# Patient Record
Sex: FEMALE | Race: OTHER | Employment: UNEMPLOYED | URBAN - METROPOLITAN AREA
[De-identification: names, ages, dates, MRNs, and addresses within clinical notes are randomized per-mention and may not be internally consistent; named-entity substitution may affect disease eponyms.]

---
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
## 2023-05-02 NOTE — TELEPHONE ENCOUNTER
----- Message from Yolanda Lau PA-C sent at 5/2/2023 11:06 AM CDT -----  Labs returned and COVID/flu were negative but strep is positive. Will treat with abx. Please confirm pharmacy.    Spoke with Dr. Donnell Quintana. Okay to decrease Zanaflex 1 mg (1/2 tab) to twice daily, discontinuing the morning dose. If child is still experiencing daytime drowsiness, advise patient to call the office and get an appointment with Man Brady CNP to be seen sooner. May consider decreasing back to 1 mg nightly.

## 2023-05-25 LAB
ACTIVATED PARTIAL THROMBOPLASTIN TIME IN PPP BY COAGULATION ASSAY: 30 SEC (ref 26–39)
ALBUMIN (G/DL) IN SER/PLAS: 4 G/DL (ref 3.4–5)
ANION GAP IN SER/PLAS: 15 MMOL/L (ref 10–20)
CALCIUM (MG/DL) IN SER/PLAS: 9.4 MG/DL (ref 8.6–10.6)
CARBON DIOXIDE, TOTAL (MMOL/L) IN SER/PLAS: 22 MMOL/L (ref 21–32)
CHLORIDE (MMOL/L) IN SER/PLAS: 105 MMOL/L (ref 98–107)
CREATININE (MG/DL) IN SER/PLAS: 0.25 MG/DL (ref 0.5–1.05)
ERYTHROCYTE DISTRIBUTION WIDTH (RATIO) BY AUTOMATED COUNT: 13.5 % (ref 11.5–14.5)
ERYTHROCYTE MEAN CORPUSCULAR HEMOGLOBIN CONCENTRATION (G/DL) BY AUTOMATED: 32.8 G/DL (ref 32–36)
ERYTHROCYTE MEAN CORPUSCULAR VOLUME (FL) BY AUTOMATED COUNT: 91 FL (ref 80–100)
ERYTHROCYTES (10*6/UL) IN BLOOD BY AUTOMATED COUNT: 4.99 X10E12/L (ref 4–5.2)
GFR FEMALE: >90 ML/MIN/1.73M2
GLUCOSE (MG/DL) IN SER/PLAS: 86 MG/DL (ref 74–99)
HEMATOCRIT (%) IN BLOOD BY AUTOMATED COUNT: 45.4 % (ref 36–46)
HEMOGLOBIN (G/DL) IN BLOOD: 14.9 G/DL (ref 12–16)
INR IN PPP BY COAGULATION ASSAY: 1 (ref 0.9–1.1)
LEUKOCYTES (10*3/UL) IN BLOOD BY AUTOMATED COUNT: 7.9 X10E9/L (ref 4.4–11.3)
NRBC (PER 100 WBCS) BY AUTOMATED COUNT: 0 /100 WBC (ref 0–0)
PHOSPHATE (MG/DL) IN SER/PLAS: 3.2 MG/DL (ref 2.5–4.9)
PLATELETS (10*3/UL) IN BLOOD AUTOMATED COUNT: 300 X10E9/L (ref 150–450)
POTASSIUM (MMOL/L) IN SER/PLAS: 4.4 MMOL/L (ref 3.5–5.3)
PREALBUMIN (MG/DL) IN SERUM OR PLASMA: 25.8 MG/DL (ref 18–40)
PROTHROMBIN TIME (PT) IN PPP BY COAGULATION ASSAY: 11.9 SEC (ref 9.8–13.4)
SODIUM (MMOL/L) IN SER/PLAS: 138 MMOL/L (ref 136–145)
UREA NITROGEN (MG/DL) IN SER/PLAS: 12 MG/DL (ref 6–23)

## 2023-05-27 LAB — STAPH/MRSA SCREEN, CULTURE: NORMAL

## 2023-07-13 LAB — C REACTIVE PROTEIN (MG/L) IN SER/PLAS: 4.1 MG/DL

## 2023-08-10 LAB
ALANINE AMINOTRANSFERASE (SGPT) (U/L) IN SER/PLAS: 903 U/L (ref 7–45)
ALBUMIN (G/DL) IN SER/PLAS: 3.7 G/DL (ref 3.4–5)
ALKALINE PHOSPHATASE (U/L) IN SER/PLAS: 126 U/L (ref 33–110)
ANION GAP IN SER/PLAS: 15 MMOL/L (ref 10–20)
ASPARTATE AMINOTRANSFERASE (SGOT) (U/L) IN SER/PLAS: 572 U/L (ref 9–39)
BASOPHILS (10*3/UL) IN BLOOD BY AUTOMATED COUNT: 0.06 X10E9/L (ref 0–0.1)
BASOPHILS/100 LEUKOCYTES IN BLOOD BY AUTOMATED COUNT: 0.8 % (ref 0–2)
BILIRUBIN TOTAL (MG/DL) IN SER/PLAS: 0.5 MG/DL (ref 0–1.2)
C REACTIVE PROTEIN (MG/L) IN SER/PLAS: 3.25 MG/DL
CALCIUM (MG/DL) IN SER/PLAS: 9.7 MG/DL (ref 8.6–10.3)
CARBON DIOXIDE, TOTAL (MMOL/L) IN SER/PLAS: 24 MMOL/L (ref 21–32)
CHLORIDE (MMOL/L) IN SER/PLAS: 102 MMOL/L (ref 98–107)
CREATININE (MG/DL) IN SER/PLAS: 0.27 MG/DL (ref 0.5–1.05)
EOSINOPHILS (10*3/UL) IN BLOOD BY AUTOMATED COUNT: 0.13 X10E9/L (ref 0–0.7)
EOSINOPHILS/100 LEUKOCYTES IN BLOOD BY AUTOMATED COUNT: 1.7 % (ref 0–6)
ERYTHROCYTE DISTRIBUTION WIDTH (RATIO) BY AUTOMATED COUNT: 15 % (ref 11.5–14.5)
ERYTHROCYTE MEAN CORPUSCULAR HEMOGLOBIN CONCENTRATION (G/DL) BY AUTOMATED: 31.7 G/DL (ref 32–36)
ERYTHROCYTE MEAN CORPUSCULAR VOLUME (FL) BY AUTOMATED COUNT: 92 FL (ref 80–100)
ERYTHROCYTES (10*6/UL) IN BLOOD BY AUTOMATED COUNT: 3.86 X10E12/L (ref 4–5.2)
GFR FEMALE: >90 ML/MIN/1.73M2
GLUCOSE (MG/DL) IN SER/PLAS: 80 MG/DL (ref 74–99)
HEMATOCRIT (%) IN BLOOD BY AUTOMATED COUNT: 35.6 % (ref 36–46)
HEMOGLOBIN (G/DL) IN BLOOD: 11.3 G/DL (ref 12–16)
IMMATURE GRANULOCYTES/100 LEUKOCYTES IN BLOOD BY AUTOMATED COUNT: 0.3 % (ref 0–0.9)
LEUKOCYTES (10*3/UL) IN BLOOD BY AUTOMATED COUNT: 7.5 X10E9/L (ref 4.4–11.3)
LYMPHOCYTES (10*3/UL) IN BLOOD BY AUTOMATED COUNT: 1.73 X10E9/L (ref 1.2–4.8)
LYMPHOCYTES/100 LEUKOCYTES IN BLOOD BY AUTOMATED COUNT: 23.1 % (ref 13–44)
MONOCYTES (10*3/UL) IN BLOOD BY AUTOMATED COUNT: 0.5 X10E9/L (ref 0.1–1)
MONOCYTES/100 LEUKOCYTES IN BLOOD BY AUTOMATED COUNT: 6.7 % (ref 2–10)
NEUTROPHILS (10*3/UL) IN BLOOD BY AUTOMATED COUNT: 5.04 X10E9/L (ref 1.2–7.7)
NEUTROPHILS/100 LEUKOCYTES IN BLOOD BY AUTOMATED COUNT: 67.4 % (ref 40–80)
PLATELETS (10*3/UL) IN BLOOD AUTOMATED COUNT: 432 X10E9/L (ref 150–450)
POTASSIUM (MMOL/L) IN SER/PLAS: 4 MMOL/L (ref 3.5–5.3)
PROTEIN TOTAL: 7.9 G/DL (ref 6.4–8.2)
SEDIMENTATION RATE, ERYTHROCYTE: 54 MM/H (ref 0–20)
SODIUM (MMOL/L) IN SER/PLAS: 137 MMOL/L (ref 136–145)
UREA NITROGEN (MG/DL) IN SER/PLAS: 9 MG/DL (ref 6–23)

## 2023-10-18 ENCOUNTER — TELEPHONE (OUTPATIENT)
Dept: PEDIATRICS | Facility: HOSPITAL | Age: 18
End: 2023-10-18
Payer: MEDICAID

## 2023-10-18 DIAGNOSIS — R74.8 ELEVATED LIVER ENZYMES: Primary | ICD-10-CM

## 2023-10-18 NOTE — TELEPHONE ENCOUNTER
This RN received a phone call from Neville (Facility Caregiver) requesting lab requisitions be faxed to [facility]. Neville stated closest  facility is ~100 miles away.     This RN reviewed [facility] for lab orders placed after last hospital admission in August 2023. Labs to be drawn: crp, cmp, ggt, hfp, smooth muscle antibody. Labs ordered in [facility], requisition printed and faxed to .     This RN spoke with Neville at 1430, fax was received. No further questions/concerns at this time.

## 2023-11-06 ENCOUNTER — APPOINTMENT (OUTPATIENT)
Dept: PEDIATRIC GASTROENTEROLOGY | Facility: CLINIC | Age: 18
End: 2023-11-06
Payer: MEDICAID

## 2023-11-08 PROBLEM — M21.959 ACQUIRED DEFORMITY OF HIP: Status: ACTIVE | Noted: 2023-11-08

## 2023-11-08 PROBLEM — M24.359 SPASTIC HIP DISLOCATION: Status: ACTIVE | Noted: 2023-11-08

## 2023-11-08 PROBLEM — G80.9 CEREBRAL PALSY (MULTI): Status: ACTIVE | Noted: 2023-11-08

## 2023-11-08 PROBLEM — G80.0: Status: ACTIVE | Noted: 2023-11-08

## 2023-11-08 PROBLEM — M86.60 OSTEOMYELITIS, CHRONIC (MULTI): Status: ACTIVE | Noted: 2023-11-08

## 2023-11-08 PROBLEM — M41.45 NEUROMUSCULAR SCOLIOSIS OF THORACOLUMBAR REGION: Status: ACTIVE | Noted: 2023-11-08

## 2023-11-08 RX ORDER — DIAZEPAM ORAL 5 MG/5ML
SOLUTION ORAL
COMMUNITY
Start: 2015-05-26

## 2023-11-08 RX ORDER — METRONIDAZOLE 250 MG/1
TABLET ORAL
COMMUNITY
Start: 2023-10-17 | End: 2023-11-09 | Stop reason: SDUPTHER

## 2023-11-08 RX ORDER — BACLOFEN 10 MG/1
1 TABLET ORAL 3 TIMES DAILY
COMMUNITY
Start: 2013-04-12

## 2023-11-08 RX ORDER — LEVONORGESTREL AND ETHINYL ESTRADIOL 0.1-0.02MG
1 KIT ORAL EVERY MORNING
COMMUNITY
Start: 2023-10-26

## 2023-11-08 RX ORDER — DIAZEPAM 2 MG/1
TABLET ORAL
COMMUNITY
Start: 2023-06-08

## 2023-11-08 RX ORDER — LEVOFLOXACIN 750 MG/1
TABLET ORAL
COMMUNITY
Start: 2023-10-13 | End: 2023-11-09 | Stop reason: SDUPTHER

## 2023-11-08 RX ORDER — TIZANIDINE 4 MG/1
TABLET ORAL
COMMUNITY
Start: 2023-10-10

## 2023-11-08 RX ORDER — GUANFACINE 1 MG/1
0.5 TABLET ORAL 2 TIMES DAILY
COMMUNITY
Start: 2015-01-30

## 2023-11-08 RX ORDER — POLYETHYLENE GLYCOL 3350 17 G/17G
POWDER, FOR SOLUTION ORAL
COMMUNITY
Start: 2023-06-08

## 2023-11-08 RX ORDER — CHLORHEXIDINE GLUCONATE ORAL RINSE 1.2 MG/ML
SOLUTION DENTAL
COMMUNITY
Start: 2023-09-21

## 2023-11-08 RX ORDER — FLUOXETINE 10 MG/1
15 TABLET ORAL DAILY
COMMUNITY
Start: 2023-10-13

## 2023-11-08 RX ORDER — NAPROXEN 500 MG/1
TABLET ORAL
COMMUNITY
Start: 2023-06-08

## 2023-11-09 ENCOUNTER — OFFICE VISIT (OUTPATIENT)
Dept: INFECTIOUS DISEASES | Facility: HOSPITAL | Age: 18
End: 2023-11-09
Payer: MEDICAID

## 2023-11-09 VITALS
SYSTOLIC BLOOD PRESSURE: 111 MMHG | TEMPERATURE: 98 F | WEIGHT: 83.33 LBS | HEART RATE: 97 BPM | DIASTOLIC BLOOD PRESSURE: 78 MMHG

## 2023-11-09 DIAGNOSIS — M46.20: Primary | ICD-10-CM

## 2023-11-09 PROCEDURE — 99214 OFFICE O/P EST MOD 30 MIN: CPT | Mod: GC | Performed by: PEDIATRICS

## 2023-11-09 PROCEDURE — 99214 OFFICE O/P EST MOD 30 MIN: CPT | Performed by: PEDIATRICS

## 2023-11-09 RX ORDER — LEVOFLOXACIN 750 MG/1
TABLET ORAL
Qty: 30 TABLET | Refills: 1 | Status: SHIPPED | OUTPATIENT
Start: 2023-11-09 | End: 2023-12-31

## 2023-11-09 RX ORDER — METRONIDAZOLE 250 MG/1
375 TABLET ORAL 3 TIMES DAILY
Qty: 135 TABLET | Refills: 1 | Status: SHIPPED | OUTPATIENT
Start: 2023-11-09 | End: 2024-01-08

## 2023-11-09 ASSESSMENT — ENCOUNTER SYMPTOMS
FLANK PAIN: 0
ARTHRALGIAS: 0
UNEXPECTED WEIGHT CHANGE: 0
STRIDOR: 0
FATIGUE: 0
FACIAL SWELLING: 0
EYE DISCHARGE: 0
MYALGIAS: 0
APPETITE CHANGE: 0
HEADACHES: 0
ABDOMINAL PAIN: 0
DYSURIA: 0
SHORTNESS OF BREATH: 0
CONSTIPATION: 0
EYE REDNESS: 0
COUGH: 0
DIARRHEA: 0
ACTIVITY CHANGE: 0
FEVER: 0
NAUSEA: 0
WOUND: 0
ADENOPATHY: 0
JOINT SWELLING: 0
BRUISES/BLEEDS EASILY: 0
CHILLS: 0
WHEEZING: 0
SORE THROAT: 0
RHINORRHEA: 0
WEAKNESS: 0
VOMITING: 0

## 2023-11-09 NOTE — PATIENT INSTRUCTIONS
Will refill the PO Flagyl and Levofloxacin. Complete 6 more weeks of therapy  Call with questions or concerns.    The phone number to the Pediatric Infectious Disease office is: [phone] or you can email us at [email or web address]. Phone calls and emails are typically addressed within 48 hours.

## 2023-11-09 NOTE — LETTER
11/09/23    Karlos Rome MD  [address]  [address]  [facility] [address]      Dear Dr. Karlos Rome MD,    I am writing to confirm that your patient, Renetta Chen, received care in my office on 11/09/23. I have enclosed a summary of the care provided to Renetta for your reference.    Please contact me with any questions you may have regarding the visit.    Sincerely,         Ericka Posey MD  [address]  [address]  [place] [facility] [id number]    CC: No Recipients [No Adverse Anesthesia Reaction] : no adverse anesthesia reaction in self or family member [No Pertinent Pulmonary History] : no history of asthma, COPD, sleep apnea, or smoking [No Pertinent Cardiac History] : no history of aortic stenosis, atrial fibrillation, coronary artery disease, recent myocardial infarction, or implantable device/pacemaker [Excellent (>10 METs)] : Excellent (>10 METs) [Chronic Kidney Disease] : no chronic kidney disease [Chronic Anticoagulation] : no chronic anticoagulation [Diabetes] : no diabetes [FreeTextEntry1] : Fistulotomy [FreeTextEntry2] : 7/31/20 [FreeTextEntry3] : Dr. Barbour [FreeTextEntry4] : anal fistula w/ intermittent drainage. requires surgical management.

## 2023-11-09 NOTE — PROGRESS NOTES
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 ALT was 903, up from 12 and AST was 527 up from 20 compared to June. Alk Phos elevated to 126, although no hyperbilirubinemia.    She was discharged on PO Flagyl and Levofloxacin and had recent lab work on 10/18 with normalized LFTs.    Of note, prior to that she was admitted to  [facility] from 6/25-7/3 for management of a post-surgical spinal hardware infection/osteomyelitis after she had developed some drainage from her spinal surgical wound, and wound was noted to have dehiscence. Per family reports, there had been contamination of her spinal wound with stool. Initially she was started on broad spectrum antibiotics vanc/zosyn, later switched to vancomycin/cefepime/metronidazole given an RUSS. She underwent two I&D's with orthopedic surgery- once 6/26, and another 6/28 with wound closure at that time. Deep surgical cultures grew E. coli (pan-susceptible), P. bivia, M. micronuciformis, while biopsy from lumbar spine during 2nd I&D grew E. coli. While her previous bone graft was replaced, she still has some of her original hardware from her first surgery. Caregiver claims that she has seen  providers since discharge, no documentation found in our system.     PMH: nonverbal   PSH: hip surgery at 10, spinal fusion 6/7  Meds: Baclofen 10 mg TID, Guanfacine 1 mg in the AM 0.5 mg in PM, Lessina, Fluoxetine 15 mg, Clorhexadine swab BID, Multivitamin, Tizanidine 6 mg " nightly, PRN: Zinc oxide ointment, Tylenol, Miralax, Ibuprofen, Triamcinolone cream. Normally takes medications  with applesauce.   Allergy: NKDA  Diet: normal pediatric diet - in bite size pieces   Social: lives in home staffed 24/7. Currently head of home has co-guardianship   immunizations: up to date  FH: aunt previously taking tizanidine but taken off for liver issues    Immunization History  Immunization History   Administered Date(s) Administered    DTaP vaccine, pediatric  (INFANRIX) 08/14/2007    DTaP, Unspecified 2003, 2003, 02/09/2004, 04/12/2004    Hepatitis A vaccine, pediatric/adolescent (HAVRIX, VAQTA) 08/14/2007, 02/05/2008    Hepatitis B vaccine, pediatric/adolescent (RECOMBIVAX, ENGERIX) 2003, 2003, 04/12/2004    HiB, unspecified 2003, 2003, 02/09/2004, 06/18/2004    MMR and varicella combined vaccine, subcutaneous (PROQUAD) 08/14/2007    MMR vaccine, subcutaneous (MMR II) 06/18/2004    Moderna SARS-CoV-2 Vaccination 01/09/2022    Pfizer Purple Cap SARS-CoV-2 01/22/2021, 02/12/2021    Pneumococcal Conjugate PCV 7 2003, 2003, 04/12/2004    Polio, Unspecified 2003, 2003, 02/09/2004    Poliovirus vaccine, subcutaneous (IPOL) 08/14/2007    Varicella vaccine, subcutaneous (VARIVAX) 02/05/2008         Review of Systems   Constitutional:  Negative for activity change, appetite change, chills, fatigue, fever and unexpected weight change.        In wheel chair with caregiver   HENT:  Negative for congestion, ear discharge, ear pain, facial swelling, rhinorrhea and sore throat.    Eyes:  Negative for discharge and redness.   Respiratory:  Negative for cough, shortness of breath, wheezing and stridor.    Cardiovascular:  Negative for chest pain.   Gastrointestinal:  Negative for abdominal pain, constipation, diarrhea, nausea and vomiting.   Genitourinary:  Negative for dysuria and flank pain.   Musculoskeletal:  Negative for arthralgias, joint  swelling and myalgias.   Skin:  Negative for rash and wound.        Some flakiness around the eyebrows and dryness and cracks in the lips.  Spine wound looks good with no erythema or discharge   Neurological:  Negative for weakness and headaches.   Hematological:  Negative for adenopathy. Does not bruise/bleed easily.       Objective   Vitals:  Vitals:    11/09/23 1058   BP: 111/78   Pulse: 97   Temp: 36.7 °C (98 °F)          Physical Exam  Constitutional:       General: She is not in acute distress.     Appearance: Normal appearance.   HENT:      Head: Normocephalic and atraumatic.      Nose: Nose normal. No congestion or rhinorrhea.      Mouth/Throat:      Mouth: Mucous membranes are moist.      Pharynx: Oropharynx is clear. No oropharyngeal exudate or posterior oropharyngeal erythema.   Cardiovascular:      Rate and Rhythm: Normal rate and regular rhythm.      Heart sounds: No murmur heard.     No gallop.   Pulmonary:      Effort: Pulmonary effort is normal. No respiratory distress.      Breath sounds: Normal breath sounds. No stridor. No wheezing, rhonchi or rales.   Abdominal:      General: Abdomen is flat. Bowel sounds are normal. There is no distension.      Palpations: Abdomen is soft.      Tenderness: There is no abdominal tenderness. There is no guarding or rebound.   Musculoskeletal:         General: No swelling, tenderness or deformity. Normal range of motion.      Cervical back: Normal range of motion and neck supple. No rigidity.   Skin:     General: Skin is warm and dry.      Capillary Refill: Capillary refill takes less than 2 seconds.      Findings: No erythema, lesion or rash.      Comments: Spine wound with no erythema or discharge   Neurological:      General: No focal deficit present.      Mental Status: She is alert and oriented to person, place, and time.   Psychiatric:         Mood and Affect: Mood normal.         Cultures:  Susceptibility data from last 400 days.  Collected Specimen Info  Organism Ampicillin Cefazolin Ciprofloxacin Gentamicin Levofloxacin Piperacillin/Tazobactam Trimethoprim/Sulfamethoxazole   06/28/23 Other Escherichia coli S S S S S S S   06/26/23 Tissue/Wound Escherichia coli            Prevotella bivia          06/26/23 Tissue/Wound Escherichia coli S S S S S S S   06/26/23 Tissue/Wound Escherichia coli            Prevotella bivia               Assessment:  Glenis is a 17 yo woman with neuromuscular scoliosis, spastic quadriplegia, cerebral palsy, chronic osteomyelitis of the hip s/p multiple hip surgeries, a T4-S2 PSIF on 6/7 who was recently admitted to [facility] from 6/25/23-7/3/23 for management of a post-surgical spinal hardware infection/osteomyelitis. She is s/p two I&D's with orthopedic surgery- 6/26 & 6/28 with wound closure. Deep surgical cultures grew E. coli (pan-susceptible), P. bivia, M. micronuciformis, while biopsy from lumbar spine during 2nd I&D grew E. coli. Her previous bone graft was replaced, but she still has some of her original hardware from her first surgery. She was admitted from 8/14-8/18 with transaminitis  found on routine lab work that is likely secondary to Augmentin. In discussions with GI, there have been no alternative explanations that better fit her clinical picture and diagnostic workup. Patient was discharged on PO Levofloxacin and Flagyl to complete a prolonged course of therapy (around 6 months). Caregiver claims that she has seen  providers since discharge, no documentation found in our system.    On today's assessment she is doing well with no concerns, care-giver with her. Back looks well with no discharge, erythema or pain. Will provide refills to complete 6 weeks of therapy ending in December to complete a total of 6 months of therapy    Plan:  - Will refill Flagyl 375mg q8, Levo 750mg once a day to complete 6 weeks of therapy.  - Care giver to call with questions or concerns, no need for further ID clinic follow up    Patient seen and  staffed with Attending Dr. Kalpesh Sanon, PGY5  Pediatric Infectious Disease Fellow  [facility]   ID Pager: [phone]

## 2023-12-07 ENCOUNTER — APPOINTMENT (OUTPATIENT)
Dept: ORTHOPEDIC SURGERY | Facility: CLINIC | Age: 18
End: 2023-12-07
Payer: MEDICAID

## 2023-12-14 ENCOUNTER — APPOINTMENT (OUTPATIENT)
Dept: ORTHOPEDIC SURGERY | Facility: CLINIC | Age: 18
End: 2023-12-14
Payer: MEDICAID

## 2023-12-21 ENCOUNTER — ANCILLARY PROCEDURE (OUTPATIENT)
Dept: RADIOLOGY | Facility: CLINIC | Age: 18
End: 2023-12-21
Payer: MEDICAID

## 2023-12-21 ENCOUNTER — OFFICE VISIT (OUTPATIENT)
Dept: ORTHOPEDIC SURGERY | Facility: CLINIC | Age: 18
End: 2023-12-21
Payer: MEDICAID

## 2023-12-21 DIAGNOSIS — G80.0 SPASTIC QUADRIPARESIS SECONDARY TO CEREBRAL PALSY (MULTI): ICD-10-CM

## 2023-12-21 PROCEDURE — 99214 OFFICE O/P EST MOD 30 MIN: CPT | Performed by: ORTHOPAEDIC SURGERY

## 2023-12-21 PROCEDURE — 72082 X-RAY EXAM ENTIRE SPI 2/3 VW: CPT | Performed by: RADIOLOGY

## 2023-12-21 PROCEDURE — 72082 X-RAY EXAM ENTIRE SPI 2/3 VW: CPT

## 2023-12-21 NOTE — PROGRESS NOTES
Renetta Chen is a 18 y.o. female who is here for a postop visit. She underwent posterior spinal fusion from T2-S2 on June 7, 2023 but this was followed by wound dehiscence and infection treated with 2 washout procedures on June 26 and June 28.  Since our last visit, she is doing extremely well.      She has no pain, is requiring no pain medications, and her incision has completely healed. She gets hip pain intermittently. It is associated with some spasticity.  Since her surgery, her posture is much better.      She previously had bilateral castle procedures. She was last seen for this preCOVID and was supposed to get a brace, but that was put on hold. She resides in a private home with nursing, now in [place], and continues to do really well. She looks much healthier than she had before moving into that home. She is now using a power chair and is back to school. She is doing MUCH better from a pain/anxiety perspective.     Past Medical History:   Diagnosis Date    Cerebral palsy, unspecified (CMS/[facility]) 07/19/2018    Cerebral palsy       Past Surgical History:   Procedure Laterality Date    HIP SURGERY  01/08/2016    Hip Surgery       Current Outpatient Medications on File Prior to Visit   Medication Sig Dispense Refill    acetaminophen (Tylenol) 325 mg tablet TAKE 2 TABLETS BY MOUTH EVERY 6 HOURS AS NEEDED FOR PAIN OR DISCOMFORT 100 tablet 0    baclofen (Lioresal) 10 mg tablet Take 1 tablet (10 mg) by mouth 3 times a day.      chlorhexidine (Peridex) 0.12 % solution RINSE MOUTH FOR 30 SECONDS AND SPIT OUT AS DIRECTED      diazePAM (Valium) 2 mg tablet       diazePAM (Valium) 5 mg/5 mL (1 mg/mL) solution Give 3ml every 6-8 hours prn muscle spasms      FLUoxetine (PROzac) 10 mg tablet Take 1.5 tablets (15 mg) by mouth once daily.      guanFACINE (Tenex) 1 mg tablet Take 0.5 tablets (0.5 mg) by mouth 2 times a day.      Lessina 0.1-20 mg-mcg tablet Take 1 tablet by mouth once daily in the morning.       levoFLOXacin (Levaquin) 750 mg tablet TAKE 1 TABLET BY MOUTH ONCE DAILY AND FOLLOW UP WITH INFECTIOUS DISEASE 30 tablet 1    metroNIDAZOLE (Flagyl) 250 mg tablet Take 1.5 tablets (375 mg) by mouth 3 times a day. 135 tablet 1    naproxen (Naprosyn) 500 mg tablet       nystatin (Mycostatin) cream APPLY TO AFFECTED AREA(S) PF BUTTOCK AND BACK EERY 6 HOURS AS DIRECTED 60 g 3    polyethylene glycol (Glycolax, Miralax) 17 gram/dose powder       tiZANidine (Zanaflex) 4 mg tablet        No current facility-administered medications on file prior to visit.       No Known Allergies    No family history on file.    Social History     Socioeconomic History    Marital status: Single     Spouse name: Not on file    Number of children: Not on file    Years of education: Not on file    Highest education level: Not on file   Occupational History    Not on file   Tobacco Use    Smoking status: Not on file    Smokeless tobacco: Not on file   Substance and Sexual Activity    Alcohol use: Not on file    Drug use: Not on file    Sexual activity: Not on file   Other Topics Concern    Not on file   Social History Narrative    Not on file     Social Determinants of Health     Financial Resource Strain: Not on file   Food Insecurity: Not on file   Transportation Needs: Not on file   Physical Activity: Not on file   Stress: Not on file   Social Connections: Not on file   Intimate Partner Violence: Not on file   Housing Stability: Not on file       ROS:  A 16 system review is negative in all systems except those listed above in the history, as reviewed by me.    Physical Exam:  Gen: WDWN female in NAD  Skin:  The skin is intact on all four extremities.  Pulses:  There are 2+ pulses in all 4 extremities.  LTS: The light touch sensation is intact.  Her incision is well-healed throughout its entire length. There are no areas of fluctuance or concerns of redness or swelling. She has no fever at this time. She is able to spontaneously move her arms  and legs. She has some stiffness of the knees, but the hips move well.     XR: Xrays of the spine show stable implants and correction with improvement in large scoliosis curve. T1-12 = 16, T12-L5 = 42. TK is 28, LL is 30. She is R5.     A/P:  18 y.o. female with CPSQ and neuromuscular scoliosis s/p PSF and bilateral castle procedures    Renetta is doing AMAZING. She has no activity restrictions and would probably benefit from a new wheelchair. She should follow up in 6 months with repeat AP and lateral scoliosis x-rays.

## 2024-06-20 ENCOUNTER — APPOINTMENT (OUTPATIENT)
Dept: ORTHOPEDIC SURGERY | Facility: CLINIC | Age: 19
End: 2024-06-20
Payer: MEDICAID

## 2024-06-20 DIAGNOSIS — M41.9 SCOLIOSIS, UNSPECIFIED SCOLIOSIS TYPE, UNSPECIFIED SPINAL REGION: ICD-10-CM

## 2024-07-18 ENCOUNTER — APPOINTMENT (OUTPATIENT)
Dept: ORTHOPEDIC SURGERY | Facility: CLINIC | Age: 19
End: 2024-07-18
Payer: MEDICAID

## 2024-07-18 VITALS — WEIGHT: 92 LBS

## 2024-07-18 DIAGNOSIS — M41.9 SCOLIOSIS, UNSPECIFIED SCOLIOSIS TYPE, UNSPECIFIED SPINAL REGION: ICD-10-CM

## 2024-07-18 PROCEDURE — 99214 OFFICE O/P EST MOD 30 MIN: CPT | Performed by: ORTHOPAEDIC SURGERY

## 2024-07-25 NOTE — PROGRESS NOTES
Renetta Chen is a 19 y.o. female who is here for a follow up visit. She underwent posterior spinal fusion from T2-S2 on June 7, 2023 but this was followed by wound dehiscence and infection treated with 2 washout procedures on June 26 and June 28 2023.  In the last year, she has had no issues or problems and no concerns related to any of this.  She is still in the same house..      She has no pain, is requiring no pain medications, and her incision has completely healed. She gets hip pain intermittently. It is associated with some spasticity.  Since her surgery, her posture is much better.      She previously had bilateral castle procedures. She was last seen for this preCOVID and was supposed to get a brace, but that was put on hold. She resides in a private home with nursing, now in [place], and continues to do really well. She looks much healthier and happy. She is now using a power chair. She is doing MUCH better from a pain/anxiety perspective.     Past Medical History:   Diagnosis Date    Cerebral palsy, unspecified (Multi) 07/19/2018    Cerebral palsy       Past Surgical History:   Procedure Laterality Date    HIP SURGERY  01/08/2016    Hip Surgery       Current Outpatient Medications on File Prior to Visit   Medication Sig Dispense Refill    baclofen (Lioresal) 10 mg tablet Take 1 tablet (10 mg) by mouth 3 times a day.      chlorhexidine (Peridex) 0.12 % solution RINSE MOUTH FOR 30 SECONDS AND SPIT OUT AS DIRECTED      diazePAM (Valium) 2 mg tablet       diazePAM (Valium) 5 mg/5 mL (1 mg/mL) solution Give 3ml every 6-8 hours prn muscle spasms      FLUoxetine (PROzac) 10 mg tablet Take 1.5 tablets (15 mg) by mouth once daily.      guanFACINE (Tenex) 1 mg tablet Take 0.5 tablets (0.5 mg) by mouth 2 times a day.      Lessina 0.1-20 mg-mcg tablet Take 1 tablet by mouth once daily in the morning.      naproxen (Naprosyn) 500 mg tablet       nystatin (Mycostatin) cream APPLY TO AFFECTED AREA(S) PF  BUTTOCK AND BACK EERY 6 HOURS AS DIRECTED 60 g 3    polyethylene glycol (Glycolax, Miralax) 17 gram/dose powder       tiZANidine (Zanaflex) 4 mg tablet        No current facility-administered medications on file prior to visit.       No Known Allergies    No family history on file.    Social History     Socioeconomic History    Marital status: Single     Spouse name: Not on file    Number of children: Not on file    Years of education: Not on file    Highest education level: Not on file   Occupational History    Not on file   Tobacco Use    Smoking status: Not on file    Smokeless tobacco: Not on file   Substance and Sexual Activity    Alcohol use: Not on file    Drug use: Not on file    Sexual activity: Not on file   Other Topics Concern    Not on file   Social History Narrative    Not on file     Social Determinants of Health     Financial Resource Strain: Not on file   Food Insecurity: No Food Insecurity (11/8/2023)    [facility]    Hunger Screening     Within the past 12 months we worried whether our food would run out before we got money to buy more.: Never True     Within the past 12 months the food we bought just didn't last and we didn't have money to get more.: Never True   Transportation Needs: Not on file   Physical Activity: Not on file   Stress: Not on file   Social Connections: Not on file   Intimate Partner Violence: Not on file   Housing Stability: Not on file       ROS:  A 16 system review is negative in all systems except those listed above in the history, as reviewed by me.    Physical Exam:  Gen: WDWN female in NAD  Skin:  The skin is intact on all four extremities.  Pulses:  There are 2+ pulses in all 4 extremities.  LTS: The light touch sensation is intact.  Her incision is well-healed throughout its entire length. There are no areas of fluctuance or concerns of redness or swelling. She has no fever at this time. She  is able to spontaneously move her arms and legs. She has some stiffness of the knees, but the hips move well. She sits up very nicely in her chair.  There is no implant prominence or kyphosis.    XR: Xrays of the spine show stable implants and correction with improvement in large scoliosis curve. T1-12 = 16, T12-L5 = 42. TK is 28, LL is 30. She is R5.     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  She has no activity restrictions and would probably benefit from a new wheelchair. She should follow up in 12 months with repeat AP and lateral scoliosis x-rays as well as an AP pelvis.  Next year we will go to 2 year follow up visits..

## 2025-06-11 ENCOUNTER — INITIAL CONSULT (OUTPATIENT)
Dept: PHYSICAL MEDICINE AND REHAB | Age: 20
End: 2025-06-11

## 2025-06-11 VITALS
BODY MASS INDEX: 23.14 KG/M2 | WEIGHT: 100 LBS | HEIGHT: 55 IN | HEART RATE: 92 BPM | DIASTOLIC BLOOD PRESSURE: 76 MMHG | SYSTOLIC BLOOD PRESSURE: 106 MMHG

## 2025-06-11 DIAGNOSIS — R25.2 SPASTICITY: ICD-10-CM

## 2025-06-11 DIAGNOSIS — G80.0 SPASTIC QUADRIPLEGIC CEREBRAL PALSY (HCC): Primary | ICD-10-CM

## 2025-06-11 NOTE — PROGRESS NOTES
[facility] PHYSICAL MEDICINE & REHABILITATION  [address] [address]  Dept: [phone]  Dept Fax: [phone]    New Patient Note    Chana Gallardo, 20 y.o., female, is c/o of Cerebral palsy  .     HPI:     History of Present Illness             HPI  Patient is a 20 year old female presenting with caregiver for evaluation of spasticity due to cerebral palsy. Patient was born premature at 24 weeks, no prenatal care, and subsequently taken to hospital in [place] with limited care. She previously followed with Pediatric Neurology (Dr. Jorgensen), and spasticity was managed with Baclofen and Zanaflex. Previously discussed Botulinum toxin as adjunct for spasticity management, though family wanted to hold off on injections at that time. Patient referred to PM&R to transition to adult care as well as re-evaluation for Botox. Per caregiver, patient primary area of tightness/spasticity is left elbow flexors/pronators and bilateral knee flexors.        Past Medical History:   Diagnosis Date    Cerebral palsy (HCC)     Failure to thrive     Feeding difficulties     Hip dislocation, right (HCC)     Spasticity       Past Surgical History:   Procedure Laterality Date    OTHER SURGICAL HISTORY  12/3/14    Laparascopic G-tube placement     Family History   Problem Relation Age of Onset    Depression Mother     Asthma Maternal Uncle     Diabetes Maternal Grandmother     Diabetes Maternal Grandfather      Social History     Socioeconomic History    Marital status: Single    Number of children: 6   Tobacco Use    Smoking status: Never     Passive exposure: Yes    Smokeless tobacco: Never   Substance and Sexual Activity    Alcohol use: No    Drug use: No    Sexual activity: Not Currently   Social History Narrative    Lives at care facility managed by \"[employer] In legal custody of her aunt.      Social Drivers of Health     Food Insecurity:

## 2025-07-23 ENCOUNTER — OFFICE VISIT (OUTPATIENT)
Dept: PHYSICAL MEDICINE AND REHAB | Age: 20
End: 2025-07-23
Payer: MEDICAID

## 2025-07-23 VITALS — SYSTOLIC BLOOD PRESSURE: 110 MMHG | TEMPERATURE: 99.3 F | HEART RATE: 90 BPM | DIASTOLIC BLOOD PRESSURE: 70 MMHG

## 2025-07-23 DIAGNOSIS — R25.2 SPASTICITY: ICD-10-CM

## 2025-07-23 DIAGNOSIS — G80.0 SPASTIC QUADRIPLEGIC CEREBRAL PALSY (HCC): Primary | ICD-10-CM

## 2025-07-23 PROCEDURE — 99212 OFFICE O/P EST SF 10 MIN: CPT | Performed by: GENERAL PRACTICE

## 2025-07-23 NOTE — PROGRESS NOTES
[facility] PHYSICAL MEDICINE & REHABILITATION  [address] [address]  Dept: [phone]  Dept Fax: [phone]    Outpatient Followup Note    Chana Gallardo, 20 y.o., female, presents for follow up c/o of spasticity  .     HPI:     History of Present Illness               HPI  Patient is a 20 year old female presenting with caregiver for follow up of Botox injections to left upper and bilateral lower extremities. Patient was last seen 6/11/2025 at which time 275 units Botox were divided between her left upper extremity and bilateral hamstrings. Today caregiver reports no issues with those injections. They feel Botox has offered good improvement in spasticity and has allowed patient to relax and better stretch her arm and legs. Continue to perform daily stretches and feel her range of motion is improving.       Past Medical History:   Diagnosis Date    Cerebral palsy (HCC)     Failure to thrive     Feeding difficulties     Hip dislocation, right (HCC)     Spasticity       Past Surgical History:   Procedure Laterality Date    OTHER SURGICAL HISTORY  12/3/14    Laparascopic G-tube placement     Family History   Problem Relation Age of Onset    Depression Mother     Asthma Maternal Uncle     Diabetes Maternal Grandmother     Diabetes Maternal Grandfather      Social History     Socioeconomic History    Marital status: Single     Spouse name: None    Number of children: 6    Years of education: None    Highest education level: None   Tobacco Use    Smoking status: Never     Passive exposure: Yes    Smokeless tobacco: Never   Substance and Sexual Activity    Alcohol use: No    Drug use: No    Sexual activity: Not Currently   Social History Narrative    Lives at care facility managed by \"[employer] In legal custody of her aunt.      Social Drivers of Health     Food Insecurity: Patient Unable To Answer (12/6/2024)    Received